# Patient Record
Sex: MALE
[De-identification: names, ages, dates, MRNs, and addresses within clinical notes are randomized per-mention and may not be internally consistent; named-entity substitution may affect disease eponyms.]

---

## 2020-07-22 ENCOUNTER — HOSPITAL ENCOUNTER (OUTPATIENT)
Dept: HOSPITAL 72 - PAN | Age: 35
Discharge: HOME | End: 2020-07-22
Payer: MEDICAID

## 2020-07-22 VITALS — SYSTOLIC BLOOD PRESSURE: 121 MMHG | DIASTOLIC BLOOD PRESSURE: 79 MMHG

## 2020-07-22 VITALS — BODY MASS INDEX: 21.94 KG/M2 | HEIGHT: 72 IN | WEIGHT: 162 LBS

## 2020-07-22 DIAGNOSIS — R14.0: ICD-10-CM

## 2020-07-22 DIAGNOSIS — F31.9: ICD-10-CM

## 2020-07-22 DIAGNOSIS — K21.9: Primary | ICD-10-CM

## 2020-07-22 DIAGNOSIS — F32.9: ICD-10-CM

## 2020-07-22 DIAGNOSIS — K59.00: ICD-10-CM

## 2020-07-22 DIAGNOSIS — R13.10: ICD-10-CM

## 2020-07-22 DIAGNOSIS — R10.9: ICD-10-CM

## 2020-07-22 NOTE — CONSULTATION
DATE OF CONSULTATION:  07/22/2020

CONSULTING PHYSICIAN:  Lawrence Leger MD



CHIEF COMPLAINT:  GERD and abdominal pain.



HISTORY OF PRESENT ILLNESS:  This is a 35-year-old male with numerous

medical problems, which I will dictate in a second, who came to our office

with complaint of severe acid reflux disease.  Currently, he has been on

PPI with some improvement, but not resolving the issue.  He also

complained of solid dysphagia.



PAST MEDICAL HISTORY:

1. _____

2. Bipolar.

3. Questionable hepatitis C.

4. Depression.

5. Cervical stenosis.



PAST SURGICAL HISTORY:  None.



MEDICATIONS:  Numerous medications.  Please see medication reconciliation

list.



FAMILY HISTORY:  Noncontributory.



SOCIAL HISTORY:  The patient denies any tobacco, alcohol, or IV drug

abuse.



ALLERGIES:  No known drug allergies.



REVIEW OF SYSTEMS:  Positive for GERD, constipation, and bloating.



PHYSICAL EXAMINATION:

VITAL SIGNS:  Temperature 99, blood pressure is 121/69, pulse 83,

respirations 20.

HEENT:  Normocephalic and atraumatic.  Sclerae anicteric.

NECK:  Supple.  No evidence of obvious lymphadenopathy.

HEART:  Regular rhythm.  Plus S1, S2.

LUNGS:  Clear to auscultation bilaterally.

ABDOMEN:  Positive bowel sounds.  Soft and nontender.  No rebound.  No

guarding.  No peritoneal sign.

EXTREMITIES:  No cyanosis.  No clubbing.  No edema.



ASSESSMENT AND PLAN:  This is a 35-year-old male patient with chronic GERD,

signs and symptoms of dysphagia.



PLAN:  Protonix twice a day.  Plan to get an endoscopy.  Reflux measures

was explained to the patient.  The patient will follow after endoscopy.









  ______________________________________________

  Lawrence Leger M.D.





DR:  Andrés

D:  07/22/2020 14:40

T:  07/22/2020 17:02

JOB#:  [id number]

CC:

## 2020-08-20 ENCOUNTER — HOSPITAL ENCOUNTER (OUTPATIENT)
Dept: HOSPITAL 72 - PAN | Age: 35
Discharge: HOME | End: 2020-08-20
Payer: MEDICAID

## 2020-08-20 VITALS — DIASTOLIC BLOOD PRESSURE: 75 MMHG | SYSTOLIC BLOOD PRESSURE: 112 MMHG

## 2020-08-20 DIAGNOSIS — K21.9: Primary | ICD-10-CM

## 2020-08-20 DIAGNOSIS — K29.70: ICD-10-CM

## 2020-08-20 PROCEDURE — 99212 OFFICE O/P EST SF 10 MIN: CPT

## 2020-08-20 NOTE — GENERAL PROGRESS NOTE
Assessment/Plan


Assessment/Plan:


GERD


gastritis








cont protonix BID


probiotics


will karyn ppi next visit





Subjective


ROS Limited/Unobtainable:  Yes


Allergies:  


Coded Allergies:  


     No Known Allergies (Unverified , 7/22/20)





Objective


General Appearance:  alert


EENT:  normal ENT inspection


Neck:  supple


Cardiovascular:  normal rate


Respiratory/Chest:  decreased breath sounds


Abdomen:  normal bowel sounds, non tender, soft


Extremities:  non-tender











Lawrence Leger MD Aug 20, 2020 15:12

## 2020-10-20 ENCOUNTER — HOSPITAL ENCOUNTER (OUTPATIENT)
Dept: HOSPITAL 72 - PAN | Age: 35
Discharge: HOME | End: 2020-10-20
Payer: MEDICAID

## 2020-10-20 VITALS — DIASTOLIC BLOOD PRESSURE: 79 MMHG | SYSTOLIC BLOOD PRESSURE: 127 MMHG

## 2020-10-20 DIAGNOSIS — K21.9: Primary | ICD-10-CM

## 2020-10-20 PROCEDURE — 99212 OFFICE O/P EST SF 10 MIN: CPT

## 2020-10-20 NOTE — GENERAL PROGRESS NOTE
Subjective


ROS Limited/Unobtainable:  Yes


Allergies:  


Coded Allergies:  


     No Known Allergies (Unverified , 7/22/20)





Objective


General Appearance:  alert


EENT:  normal ENT inspection


Neck:  supple


Cardiovascular:  normal rate


Respiratory/Chest:  lungs clear


Abdomen:  normal bowel sounds, non tender, soft


Extremities:  non-tender





Assessment/Plan


Assessment/Plan:


chronic GERD


s/p EGD


on ppi BID


add baclofen


will karyn ppi next month











Lawrence Leger MD             Oct 20, 2020 11:37